# Patient Record
(demographics unavailable — no encounter records)

---

## 2018-07-22 NOTE — EMERGENCY DEPARTMENT RECORD
History of Present Illness





- General


Chief complaint: Extremity Problem


Stated complaint: L LEG INFECTED


Time Seen by Provider: 07/22/18 09:00


Source: Patient


Mode of Arrival: Ambulatory


Limitations: No limitations





- History of Present Illness


Initial comments: 





38 yo male presents with left leg redness.  He scratched the front of his left 

lower leg a few days ago at work.  The area now has some mild redness.  No 

fevers, chills, aches.  He is allergic to dairy and refuses tetanus updates.  

He is otherwise healthy.  No chills.  


MD Complaint: Extremity pain


-: Days(s) (1)


Location: Left


-: Yes Myalgia


Radiation: Distal


Quality: Aching


Consistency: Constant


Improves with: Nothing


Worsens with: Nothing


Associated Symptoms: Denies other symptoms





- Related Data


 Previous Rx's











 Medication  Instructions  Recorded


 


Cephalexin [Keflex] 500 mg PO QID #28 cap 07/22/18











 Allergies











Allergy/AdvReac Type Severity Reaction Status Date / Time


 


Egg Derived Allergy  HYPERSENSIT Verified 07/22/18 09:11





   IVITY  


 


lactase [From Dairy Aid] Allergy  HYPERSENSIT Verified 07/22/18 09:11





   IVITY  














Review of Systems


Constitutional: Denies: Chills, Fever, Malaise, Weakness


Eyes: Denies: Eye discharge


ENT: Denies: Congestion, Throat pain


Respiratory: Denies: Cough


Cardiovascular: Denies: Chest pain, Syncope


Endocrine: Denies: Fatigue


Gastrointestinal: Denies: Abdominal pain, Diarrhea, Nausea, Vomiting


Genitourinary: Denies: Dysuria, Frequency


Musculoskeletal: Reports: As per HPI, Myalgia.  Denies: Arthralgia, Back pain


Skin: Reports: As per HPI, Change in color


Neurological: Denies: Headache


Psychiatric: Denies: Anxiety


Hematological/Lymphatic: Denies: Easy bleeding, Easy bruising





Physical Exam





- General


General Appearance: Alert, Oriented x3, Cooperative, No acute distress


Limitations: No limitations





- Head


Head exam: Normal inspection





- Eye


Eye exam: Normal appearance





- ENT


ENT exam: Normal exam


Ear exam: Normal external inspection


Nasal Exam: Normal inspection


Mouth exam: Normal external inspection





- Neck


Neck exam: Normal inspection





- Respiratory


Respiratory exam: Normal lung sounds bilaterally.  negative: Respiratory 

distress





- Cardiovascular


Cardiovascular Exam: Regular rate, Normal rhythm, Normal heart sounds





- Extremities


Extremities exam: Full ROM, Normal capillary refill.  negative: Normal 

inspection, Calf tenderness, Joint swelling, Pedal edema, Tenderness


Image of Full Body: 


  __________________________














  __________________________





 1 - two linear abrasions.  very mild erythema.  no swelling.  no pus.  no 

fluctuance to suggest abscess.  painless weight bearing








- Neurological


Neurological exam: Alert, Oriented X3





- Psychiatric


Psychiatric exam: Normal affect, Normal mood





- Skin


Skin exam: Erythema





Course





- Reevaluation(s)


Reevaluation #1: 





07/22/18 09:12


Mild cellulitis


He was instructed to stop triple topical antibiotic as it could be a sensitivity


Keflex prescribed


He refused tetanus update due to prior reaction with a dairy allergy





Disposition


Disposition: Discharge


Clinical Impression: 


Cellulitis


Qualifiers:


 Site of cellulitis: unspecified site Qualified Code(s): L03.90 - Cellulitis, 

unspecified





Disposition: Home, Self-Care


Condition: (1) Good


Instructions:  Cellulitis (ED)


Additional Instructions: 


Elevate and avoid prolonged standing


Return if worse, fever or chills


Take the antibiotic until finished


Prescriptions: 


Cephalexin [Keflex] 500 mg PO QID #28 cap


Time of Disposition: 09:13





Quality





- Quality Measures


Quality Measures: N/A





- Blood Pressure Screening


Does Patient Have Any of the Following: No


Systolic Measurement: ~


Screening for High Blood Pressure: < Pre-Hypertensive BP, F/U Documented > [

]


Pre-Hypertensive Follow-up Interventions: Referral to alternative/primary care 

provider.

## 2018-12-09 NOTE — EMERGENCY DEPARTMENT RECORD
History of Present Illness





- General


Chief Complaint: Asthma


Stated Complaint: ASTHMA ATTACK


Time Seen by Provider: 12/09/18 21:05


Source: Patient


Mode of Arrival: Ambulatory


Limitations: No limitations





- History of Present Illness


Initial Comments: 





37 yo male presents to ED for evaluation of difficulty in breathing, wheezing, 

and non-productive cough symptoms that began this evening while vacuuming up 

pet hair and dander.  Patient reports a history of asthma exacerbated by pet 

hair as well.  Patient used his SO's pro-air at home this evening with some 

improvement, however symptoms worsened within 1 hour of using the inhaler.  

Patient denies fevers, chills, or recent illness symptoms.


MD Complaint: "Asthma attack"


Onset/Timing: 3


-: Hour(s)


Asthma History: Childhood onset


Severity: Moderate


Context: Pet exposure


Associated Symptoms: Dry cough


Treatments Prior to Arrival: Inhaled bronchodilator





- Related Data


Current Asthma Therapy: None


 Previous Rx's











 Medication  Instructions  Recorded


 


Albuterol Sulfate [Proair Hfa] 1 - 2 puff IH .EVERY 4-6 HOURS PRN 12/09/18





 #1 inhaler 


 


Prednisone [Prednisone 20Mg] 20 mg PO TID #12 tab 12/09/18











 Allergies











Allergy/AdvReac Type Severity Reaction Status Date / Time


 


Egg Derived Allergy  HYPERSENSIT Verified 12/09/18 21:06





   IVITY  


 


lactase [From Dairy Aid] Allergy  HYPERSENSIT Verified 12/09/18 21:06





   IVITY  


 


red dye Allergy  HYPERSENSIT Verified 12/09/18 21:26





   IVITY  














Review of Systems


Constitutional: Denies: Chills, Fever, Malaise, Night sweats


Eyes: Denies: Eye discharge, Eye pain


ENT: Denies: Congestion, Ear pain, Epistaxis


Respiratory: Reports: Cough, Dyspnea, Wheezes


Cardiovascular: Denies: Chest pain, Dyspnea on exertion, Edema


Endocrine: Denies: Fatigue, Heat or cold intolerance


Gastrointestinal: Denies: Abdominal pain, Nausea, Vomiting


Genitourinary: Denies: Incontinence, Retention


Musculoskeletal: Denies: Arthralgia, Back pain


Skin: Denies: Bruising, Change in color, Change in hair/nails


Neurological: Denies: Abnormal gait, Confusion, Headache, Seizure


Psychiatric: Denies: Anxiety


Hematological/Lymphatic: Denies: Anemia, Blood Clots





Past Medical History





- SOCIAL HISTORY


Smoking Status: Never smoker


Drug Use: None





- RESPIRATORY


Hx Respiratory Disorders: Yes


Hx Asthma: Yes





- CARDIOVASCULAR


Hx Cardio Disorders: No





- NEURO


Hx Neuro Disorders: No





- GI


Hx GI Disorders: No





- 


Hx Genitourinary Disorders: No





- ENDOCRINE


Hx Endocrine Disorders: No





- MUSCULOSKELETAL


Hx Musculoskeletal Disorders: No





- PSYCH


Hx Psych Problems: No





- HEMATOLOGY/ONCOLOGY


Hx Hematology/Oncology Disorders: No





Family Medical History


Hx Heart Disease: Father





Physical Exam





- General


General Appearance: Alert, Oriented x3, Cooperative, Moderate distress


Limitations: No limitations





- Head


Head exam: Atraumatic, Normocephalic, Normal inspection


Head exam detail: negative: Abrasion, Contusion, Purdy's sign, General 

tenderness, Hematoma, Laceration





- Eye


Eye exam: Normal appearance.  negative: Conjunctival injection, Periorbital 

swelling, Periorbital tenderness, Scleral icterus





- ENT


Ear exam: negative: Auricular hematoma, Auricular trauma


Nasal Exam: negative: Active bleeding, Discharge, Dried blood, Foreign body


Mouth exam: negative: Drooling, Laceration, Muffled voice, Tongue elevation





- Neck


Neck exam: Normal inspection.  negative: Meningismus, Tenderness





- Respiratory


Respiratory exam: Respiratory distress, Wheezes.  negative: Rales, Rhonchi, 

Stridor





- Cardiovascular


Cardiovascular Exam: Regular rate, Normal rhythm, Normal heart sounds





- GI/Abdominal


GI/Abdominal exam: Soft.  negative: Rebound, Rigid, Tenderness





- Rectal


Rectal exam: Deferred





- 


 exam: Deferred





- Extremities


Extremities exam: Normal inspection.  negative: Pedal edema, Tenderness





- Back


Back exam: Denies: CVA tenderness (R), CVA tenderness (L)





- Neurological


Neurological exam: Alert, Normal gait, Oriented X3





- Psychiatric


Psychiatric exam: Normal affect, Normal mood





- Skin


Skin exam: Normal color.  negative: Abrasion


Type of lesion: negative: abrasion





Course





 Vital Signs











  12/09/18





  20:58


 


Temperature 98.1 F


 


Pulse Rate [ 94 H





Pulse Ox Probe] 


 


Respiratory 16





Rate 


 


Blood Pressure 120/86





[Left Arm] 


 


Pulse Ox 93 L














- Reevaluation(s)


Reevaluation #1: 





12/09/18 21:09


Patient was seen and examined, duoneb ordered as well as Prednisone.


Will reassess in 20-30 minutes.


Reevaluation #2: 





12/09/18 22:09


Patient was reassessed following 2nd breathing treatment, patient appears 

stable for discharge at this time.





Disposition


Disposition: Discharge


Clinical Impression: 


Acute asthma exacerbation


Qualifiers:


 Asthma severity: mild Asthma persistence: intermittent Qualified Code(s): 

J45.21 - Mild intermittent asthma with (acute) exacerbation





Disposition: Home, Self-Care


Condition: (2) Stable


Instructions:  Asthma (ED)


Additional Instructions: 


Return to ED if your symptoms worsen or if you have any concerns.


Prednisone as directed.


Follow-up with your family doctor in 3-5 days as directed.


Prescriptions: 


Albuterol Sulfate [Proair Hfa] 1 - 2 puff IH .EVERY 4-6 HOURS PRN #1 inhaler


 PRN Reason: Difficulty In Breathing


Prednisone [Prednisone 20Mg] 20 mg PO TID #12 tab


Forms:  Patient Portal Access


Time of Disposition: 22:09





Quality





- Quality Measures


Quality Measures: N/A





- Blood Pressure Screening


Does Patient Have Any of the Following: No


Blood Pressure Classification: Pre-Hypertensive BP Reading


Systolic Measurement: 120


Diastolic Measurement: 86


Screening for High Blood Pressure: < Pre-Hypertensive BP, F/U Documented > [

]


Pre-Hypertensive Follow-up Interventions: Referral to alternative/primary care 

provider.